# Patient Record
Sex: MALE | Race: ASIAN | NOT HISPANIC OR LATINO | URBAN - METROPOLITAN AREA
[De-identification: names, ages, dates, MRNs, and addresses within clinical notes are randomized per-mention and may not be internally consistent; named-entity substitution may affect disease eponyms.]

---

## 2023-09-13 ENCOUNTER — EMERGENCY (EMERGENCY)
Facility: HOSPITAL | Age: 33
LOS: 0 days | Discharge: HOME | End: 2023-09-13
Attending: STUDENT IN AN ORGANIZED HEALTH CARE EDUCATION/TRAINING PROGRAM
Payer: MEDICAID

## 2023-09-13 VITALS
HEART RATE: 84 BPM | SYSTOLIC BLOOD PRESSURE: 169 MMHG | RESPIRATION RATE: 18 BRPM | WEIGHT: 163.14 LBS | DIASTOLIC BLOOD PRESSURE: 109 MMHG | HEIGHT: 65 IN | OXYGEN SATURATION: 100 % | TEMPERATURE: 98 F

## 2023-09-13 VITALS — DIASTOLIC BLOOD PRESSURE: 106 MMHG | HEART RATE: 68 BPM | SYSTOLIC BLOOD PRESSURE: 159 MMHG

## 2023-09-13 DIAGNOSIS — I10 ESSENTIAL (PRIMARY) HYPERTENSION: ICD-10-CM

## 2023-09-13 DIAGNOSIS — K21.9 GASTRO-ESOPHAGEAL REFLUX DISEASE WITHOUT ESOPHAGITIS: ICD-10-CM

## 2023-09-13 DIAGNOSIS — E11.9 TYPE 2 DIABETES MELLITUS WITHOUT COMPLICATIONS: ICD-10-CM

## 2023-09-13 DIAGNOSIS — R07.89 OTHER CHEST PAIN: ICD-10-CM

## 2023-09-13 LAB
ALBUMIN SERPL ELPH-MCNC: 4.7 G/DL — SIGNIFICANT CHANGE UP (ref 3.5–5.2)
ALP SERPL-CCNC: 62 U/L — SIGNIFICANT CHANGE UP (ref 30–115)
ALT FLD-CCNC: 35 U/L — SIGNIFICANT CHANGE UP (ref 0–41)
ANION GAP SERPL CALC-SCNC: 10 MMOL/L — SIGNIFICANT CHANGE UP (ref 7–14)
AST SERPL-CCNC: 35 U/L — SIGNIFICANT CHANGE UP (ref 0–41)
BASOPHILS # BLD AUTO: 0.07 K/UL — SIGNIFICANT CHANGE UP (ref 0–0.2)
BASOPHILS NFR BLD AUTO: 0.8 % — SIGNIFICANT CHANGE UP (ref 0–1)
BILIRUB DIRECT SERPL-MCNC: <0.2 MG/DL — SIGNIFICANT CHANGE UP (ref 0–0.3)
BILIRUB INDIRECT FLD-MCNC: >0.2 MG/DL — SIGNIFICANT CHANGE UP (ref 0.2–1.2)
BILIRUB SERPL-MCNC: 0.4 MG/DL — SIGNIFICANT CHANGE UP (ref 0.2–1.2)
BUN SERPL-MCNC: 12 MG/DL — SIGNIFICANT CHANGE UP (ref 10–20)
CALCIUM SERPL-MCNC: 9.7 MG/DL — SIGNIFICANT CHANGE UP (ref 8.4–10.5)
CHLORIDE SERPL-SCNC: 100 MMOL/L — SIGNIFICANT CHANGE UP (ref 98–110)
CO2 SERPL-SCNC: 27 MMOL/L — SIGNIFICANT CHANGE UP (ref 17–32)
CREAT SERPL-MCNC: 1.1 MG/DL — SIGNIFICANT CHANGE UP (ref 0.7–1.5)
EGFR: 90 ML/MIN/1.73M2 — SIGNIFICANT CHANGE UP
EOSINOPHIL # BLD AUTO: 0.18 K/UL — SIGNIFICANT CHANGE UP (ref 0–0.7)
EOSINOPHIL NFR BLD AUTO: 2 % — SIGNIFICANT CHANGE UP (ref 0–8)
GLUCOSE SERPL-MCNC: 211 MG/DL — HIGH (ref 70–99)
HCT VFR BLD CALC: 39.3 % — LOW (ref 42–52)
HGB BLD-MCNC: 13.6 G/DL — LOW (ref 14–18)
IMM GRANULOCYTES NFR BLD AUTO: 0.5 % — HIGH (ref 0.1–0.3)
LACTATE SERPL-SCNC: 1.5 MMOL/L — SIGNIFICANT CHANGE UP (ref 0.7–2)
LIDOCAIN IGE QN: 30 U/L — SIGNIFICANT CHANGE UP (ref 7–60)
LYMPHOCYTES # BLD AUTO: 3 K/UL — SIGNIFICANT CHANGE UP (ref 1.2–3.4)
LYMPHOCYTES # BLD AUTO: 32.5 % — SIGNIFICANT CHANGE UP (ref 20.5–51.1)
MCHC RBC-ENTMCNC: 27.8 PG — SIGNIFICANT CHANGE UP (ref 27–31)
MCHC RBC-ENTMCNC: 34.6 G/DL — SIGNIFICANT CHANGE UP (ref 32–37)
MCV RBC AUTO: 80.2 FL — SIGNIFICANT CHANGE UP (ref 80–94)
MONOCYTES # BLD AUTO: 0.56 K/UL — SIGNIFICANT CHANGE UP (ref 0.1–0.6)
MONOCYTES NFR BLD AUTO: 6.1 % — SIGNIFICANT CHANGE UP (ref 1.7–9.3)
NEUTROPHILS # BLD AUTO: 5.36 K/UL — SIGNIFICANT CHANGE UP (ref 1.4–6.5)
NEUTROPHILS NFR BLD AUTO: 58.1 % — SIGNIFICANT CHANGE UP (ref 42.2–75.2)
NRBC # BLD: 0 /100 WBCS — SIGNIFICANT CHANGE UP (ref 0–0)
PLATELET # BLD AUTO: 361 K/UL — SIGNIFICANT CHANGE UP (ref 130–400)
PMV BLD: 9 FL — SIGNIFICANT CHANGE UP (ref 7.4–10.4)
POTASSIUM SERPL-MCNC: 4.1 MMOL/L — SIGNIFICANT CHANGE UP (ref 3.5–5)
POTASSIUM SERPL-SCNC: 4.1 MMOL/L — SIGNIFICANT CHANGE UP (ref 3.5–5)
PROT SERPL-MCNC: 7.9 G/DL — SIGNIFICANT CHANGE UP (ref 6–8)
RBC # BLD: 4.9 M/UL — SIGNIFICANT CHANGE UP (ref 4.7–6.1)
RBC # FLD: 13.1 % — SIGNIFICANT CHANGE UP (ref 11.5–14.5)
SODIUM SERPL-SCNC: 137 MMOL/L — SIGNIFICANT CHANGE UP (ref 135–146)
TROPONIN T SERPL-MCNC: <0.01 NG/ML — SIGNIFICANT CHANGE UP
TROPONIN T SERPL-MCNC: <0.01 NG/ML — SIGNIFICANT CHANGE UP
WBC # BLD: 9.22 K/UL — SIGNIFICANT CHANGE UP (ref 4.8–10.8)
WBC # FLD AUTO: 9.22 K/UL — SIGNIFICANT CHANGE UP (ref 4.8–10.8)

## 2023-09-13 PROCEDURE — 84484 ASSAY OF TROPONIN QUANT: CPT

## 2023-09-13 PROCEDURE — 85025 COMPLETE CBC W/AUTO DIFF WBC: CPT

## 2023-09-13 PROCEDURE — 99285 EMERGENCY DEPT VISIT HI MDM: CPT

## 2023-09-13 PROCEDURE — 83690 ASSAY OF LIPASE: CPT

## 2023-09-13 PROCEDURE — 36415 COLL VENOUS BLD VENIPUNCTURE: CPT

## 2023-09-13 PROCEDURE — 71045 X-RAY EXAM CHEST 1 VIEW: CPT | Mod: 26

## 2023-09-13 PROCEDURE — 80048 BASIC METABOLIC PNL TOTAL CA: CPT

## 2023-09-13 PROCEDURE — 96374 THER/PROPH/DIAG INJ IV PUSH: CPT

## 2023-09-13 PROCEDURE — 83605 ASSAY OF LACTIC ACID: CPT

## 2023-09-13 PROCEDURE — 80076 HEPATIC FUNCTION PANEL: CPT

## 2023-09-13 PROCEDURE — 93010 ELECTROCARDIOGRAM REPORT: CPT

## 2023-09-13 PROCEDURE — 93005 ELECTROCARDIOGRAM TRACING: CPT

## 2023-09-13 PROCEDURE — 71045 X-RAY EXAM CHEST 1 VIEW: CPT

## 2023-09-13 PROCEDURE — 99285 EMERGENCY DEPT VISIT HI MDM: CPT | Mod: 25

## 2023-09-13 PROCEDURE — 96375 TX/PRO/DX INJ NEW DRUG ADDON: CPT

## 2023-09-13 RX ORDER — FAMOTIDINE 10 MG/ML
1 INJECTION INTRAVENOUS
Qty: 28 | Refills: 0
Start: 2023-09-13 | End: 2023-09-26

## 2023-09-13 RX ORDER — SODIUM CHLORIDE 9 MG/ML
1000 INJECTION INTRAMUSCULAR; INTRAVENOUS; SUBCUTANEOUS ONCE
Refills: 0 | Status: COMPLETED | OUTPATIENT
Start: 2023-09-13 | End: 2023-09-13

## 2023-09-13 RX ORDER — FAMOTIDINE 10 MG/ML
20 INJECTION INTRAVENOUS ONCE
Refills: 0 | Status: COMPLETED | OUTPATIENT
Start: 2023-09-13 | End: 2023-09-13

## 2023-09-13 RX ORDER — ONDANSETRON 8 MG/1
4 TABLET, FILM COATED ORAL ONCE
Refills: 0 | Status: COMPLETED | OUTPATIENT
Start: 2023-09-13 | End: 2023-09-13

## 2023-09-13 RX ORDER — SUCRALFATE 1 G
10 TABLET ORAL
Qty: 160 | Refills: 0
Start: 2023-09-13 | End: 2023-09-16

## 2023-09-13 RX ADMIN — FAMOTIDINE 20 MILLIGRAM(S): 10 INJECTION INTRAVENOUS at 18:09

## 2023-09-13 RX ADMIN — SODIUM CHLORIDE 1000 MILLILITER(S): 9 INJECTION INTRAMUSCULAR; INTRAVENOUS; SUBCUTANEOUS at 18:15

## 2023-09-13 RX ADMIN — SODIUM CHLORIDE 1000 MILLILITER(S): 9 INJECTION INTRAMUSCULAR; INTRAVENOUS; SUBCUTANEOUS at 18:09

## 2023-09-13 RX ADMIN — ONDANSETRON 4 MILLIGRAM(S): 8 TABLET, FILM COATED ORAL at 18:09

## 2023-09-13 NOTE — ED PROVIDER NOTE - OBJECTIVE STATEMENT
33-year-old male with history of hypertension, diabetes, GERD presents to the ED complaining of midsternal chest burning for 2 to 3 days.  Patient states is visiting from [place] and has been having high-fat diet.  Patient denies any nausea, vomiting, diarrhea, fever, chills, shortness of breath, leg pain, leg swelling or weakness.

## 2023-09-13 NOTE — ED ADULT NURSE NOTE - OBJECTIVE STATEMENT
33 year old male, presenting to ED c/o chest pain. Pt c/o chest pain x4 days. Denies n/v/d/fevers/chills. Pt A&Ox4, ambulatory.     Pt placed on Tele/O2 monitor.

## 2023-09-13 NOTE — ED PROVIDER NOTE - ATTENDING APP SHARED VISIT CONTRIBUTION OF CARE
33-year-old male with history of hypertension, diabetes, GERD presents to the ED complaining of midsternal chest burning for 2 to 3 days. Patient states he had an EGD in [facility] last year for same pain, diagnosed with GERD, was following a strict regimen of diet and anti-acids. was feeling fine however now [place] the states, has had a poor diet, has not had his GERD medications. pain is burning symptoms, non exertional, non radiating, no nausea or diaphoresis, no SOB. no fam hx of ACS/PE/DVT. states he just needs his GERD medicine.     CONSTITUTIONAL: Well-developed; well-nourished; in no acute distress.   SKIN: warm, dry  HEAD: Normocephalic; atraumatic.  EYES: PERRL, EOMI, no conjunctival erythema  ENT: No nasal discharge; airway clear.  NECK: Supple; non tender.  CARD: S1, S2 normal; no murmurs, gallops, or rubs. Regular rate and rhythm.   RESP: No wheezes, rales or rhonchi.  ABD: soft ntnd  EXT: Normal ROM.  No clubbing, cyanosis or edema. .   NEURO: Alert, oriented, grossly unremarkable  PSYCH: Cooperative, appropriate.

## 2023-09-13 NOTE — ED PROVIDER NOTE - PATIENT PORTAL LINK FT
You can access the FollowMyHealth Patient Portal offered by [facility] by registering at the following website: [email or web address]. By joining [facility] FollowMyHealth portal, you will also be able to view your health information using other applications (apps) compatible with our system.

## 2023-09-13 NOTE — ED ADULT NURSE NOTE - NSFALLUNIVINTERV_ED_ALL_ED
Bed/Stretcher in lowest position, wheels locked, appropriate side rails in place/Call bell, personal items and telephone in reach/Instruct patient to call for assistance before getting out of bed/chair/stretcher/Non-slip footwear applied when patient is off stretcher/[place] to call system/Physically safe environment - no spills, clutter or unnecessary equipment/Purposeful proactive rounding/Room/bathroom lighting operational, light cord in reach

## 2023-09-13 NOTE — ED PROVIDER NOTE - CLINICAL SUMMARY MEDICAL DECISION MAKING FREE TEXT BOX
33-year-old male with history of hypertension, diabetes, GERD presents to the ED complaining of midsternal chest burning for 2 to 3 days. Patient states he had an EGD in [facility] last year for same pain, diagnosed with GERD, was following a strict regimen of diet and anti-acids. was feeling fine however [place] states, has had a poor diet, has not had his GERD medications. pain is burning symptoms, non exertional, non radiating, no nausea or diaphoresis, no SOB. no fam hx of ACS/PE/DVT. states he just needs his GERD medicine. EKG no ischemic changes. Labs unremarkable, trop neg x 2. CXR with no focal opacities. Patient given GERD cocktail with complete resolution in symptoms. stable for DC at this time. return precautions discussed in detail with the patient.

## 2023-09-13 NOTE — ED PROVIDER NOTE - NSFOLLOWUPINSTRUCTIONS_ED_ALL_ED_FT
Our Emergency Department Referral Coordinators will be reaching out to you in the next 24-48 hours from 9:00am to 5:00pm with a follow up appointment. Please expect a phone call from the hospital in that time frame. If you do not receive a call or if you have any questions or concerns, you can reach them at   [phone]      Gastroesophageal Reflux Disease, Adult    Gastroesophageal reflux (MARIA ESTHER) happens when acid from the stomach flows up into the tube that connects the mouth and the stomach (esophagus). Normally, food travels down the esophagus and stays in the stomach to be digested. However, when a person has MARIA ESTHER, food and stomach acid sometimes move back up into the esophagus. If this becomes a more serious problem, the person may be diagnosed with a disease called gastroesophageal reflux disease (GERD). GERD occurs when the reflux:  Happens often.  Causes frequent or severe symptoms.  Causes problems such as damage to the esophagus.  When stomach acid comes in contact with the esophagus, the acid may cause inflammation in the esophagus. Over time, GERD may create small holes (ulcers) in the lining of the esophagus.    What are the causes?  This condition is caused by a problem with the muscle between the esophagus and the stomach (lower esophageal sphincter, or LES). Normally, the LES muscle closes after food passes through the esophagus to the stomach. When the LES is weakened or abnormal, it does not close properly, and that allows food and stomach acid to go back up into the esophagus.    The LES can be weakened by certain dietary substances, medicines, and medical conditions, including:  Tobacco use.  Pregnancy.  Having a hiatal hernia.  Alcohol use.  Certain foods and beverages, such as coffee, chocolate, onions, and peppermint.  What increases the risk?  You are more likely to develop this condition if you:  Have an increased body weight.  Have a connective tissue disorder.  Take NSAIDs, such as ibuprofen.  What are the signs or symptoms?  Symptoms of this condition include:  Heartburn.  Difficult or painful swallowing and the feeling of having a lump in the throat.  A bitter taste in the mouth.  Bad breath and having a large amount of saliva.  Having an upset or bloated stomach and belching.  Chest pain. Different conditions can cause chest pain. Make sure you see your health care provider if you experience chest pain.  Shortness of breath or wheezing.  Ongoing (chronic) cough or a nighttime cough.  Wearing away of tooth enamel.  Weight loss.  How is this diagnosed?  This condition may be diagnosed based on a medical history and a physical exam. To determine if you have mild or severe GERD, your health care provider may also monitor how you respond to treatment. You may also have tests, including:  A test to examine your stomach and esophagus with a small camera (endoscopy).  A test that measures the acidity level in your esophagus.  A test that measures how much pressure is on your esophagus.  A barium swallow or modified barium swallow test to show the shape, size, and functioning of your esophagus.  How is this treated?  Treatment for this condition may vary depending on how severe your symptoms are. Your health care provider may recommend:  Changes to your diet.  Medicine.  Surgery.  The goal of treatment is to help relieve your symptoms and to prevent complications.    Follow these instructions at home:  Eating and drinking      Follow a diet as recommended by your health care provider. This may involve avoiding foods and drinks such as:  Coffee and tea, with or without caffeine.  Drinks that contain alcohol.  Energy drinks and sports drinks.  Carbonated drinks or sodas.  Chocolate and cocoa.  Peppermint and mint flavorings.  Garlic and onions.  Horseradish.  Spicy and acidic foods, including peppers, chili powder, parra powder, vinegar, hot sauces, and barbecue sauce.  Citrus fruit juices and citrus fruits, such as oranges, vivi, and limes.  Tomato-based foods, such as red sauce, chili, salsa, and pizza with red sauce.  Fried and fatty foods, such as donuts, french fries, potato chips, and high-fat dressings.  High-fat meats, such as hot dogs and fatty cuts of red and white meats, such as rib eye steak, sausage, ham, and watts.  High-fat dairy items, such as whole milk, butter, and cream cheese.  Eat small, frequent meals instead of large meals.  Avoid drinking large amounts of liquid with your meals.  Avoid eating meals during the 2–3 hours before bedtime.  Avoid lying down right after you eat.  Do not exercise right after you eat.  Lifestyle      Do not use any products that contain nicotine or tobacco. These products include cigarettes, chewing tobacco, and vaping devices, such as e-cigarettes. If you need help quitting, ask your health care provider.  Try to reduce your stress by using methods such as yoga or meditation. If you need help reducing stress, ask your health care provider.  If you are overweight, reduce your weight to an amount that is healthy for you. Ask your health care provider for guidance about a safe weight loss goal.  General instructions    Pay attention to any changes in your symptoms.  Take over-the-counter and prescription medicines only as told by your health care provider. Do not take aspirin, ibuprofen, or other NSAIDs unless your health care provider told you to take these medicines.  Wear loose-fitting clothing. Do not wear anything tight around your waist that causes pressure on your abdomen.  Raise (elevate) the head of your bed about 6 inches (15 cm). You can use a wedge to do this.  Avoid bending over if this makes your symptoms worse.  Keep all follow-up visits. This is important.  Contact a health care provider if:  You have:  New symptoms.  Unexplained weight loss.  Difficulty swallowing or it hurts to swallow.  Wheezing or a persistent cough.  A hoarse voice.  Your symptoms do not improve with treatment.  Get help right away if:  You have sudden pain in your arms, neck, jaw, teeth, or back.  You suddenly feel sweaty, dizzy, or light-headed.  You have chest pain or shortness of breath.  You vomit and the vomit is green, yellow, or black, or it looks like blood or coffee grounds.  You faint.  You have stool that is red, bloody, or black.  You cannot swallow, drink, or eat.  These symptoms may represent a serious problem that is an emergency. Do not wait to see if the symptoms will go away. Get medical help right away. Call your local emergency services (911 in the [place] Do not drive yourself to the hospital.    Summary  Gastroesophageal reflux happens when acid from the stomach flows up into the esophagus. GERD is a disease in which the reflux happens often, causes frequent or severe symptoms, or causes problems such as damage to the esophagus.  Treatment for this condition may vary depending on how severe your symptoms are. Your health care provider may recommend diet and lifestyle changes, medicine, or surgery.  Contact a health care provider if you have new or worsening symptoms.  Take over-the-counter and prescription medicines only as told by your health care provider. Do not take aspirin, ibuprofen, or other NSAIDs unless your health care provider told you to do so.  Keep all follow-up visits as told by your health care provider. This is important.  This information is not intended to replace advice given to you by your health care provider. Make sure you discuss any questions you have with your health care provider.